# Patient Record
Sex: FEMALE | Race: WHITE | NOT HISPANIC OR LATINO | Employment: UNEMPLOYED | URBAN - METROPOLITAN AREA
[De-identification: names, ages, dates, MRNs, and addresses within clinical notes are randomized per-mention and may not be internally consistent; named-entity substitution may affect disease eponyms.]

---

## 2017-02-09 ENCOUNTER — TELEPHONE (OUTPATIENT)
Dept: CARDIOLOGY | Age: 42
End: 2017-02-09

## 2017-02-10 ENCOUNTER — TELEPHONE (OUTPATIENT)
Dept: TRANSPLANT | Age: 42
End: 2017-02-10

## 2017-02-17 ENCOUNTER — OFFICE VISIT (OUTPATIENT)
Dept: CARDIOLOGY | Age: 42
End: 2017-02-17
Attending: INTERNAL MEDICINE

## 2017-02-17 VITALS
HEART RATE: 70 BPM | WEIGHT: 177.5 LBS | HEIGHT: 64 IN | DIASTOLIC BLOOD PRESSURE: 62 MMHG | SYSTOLIC BLOOD PRESSURE: 110 MMHG | OXYGEN SATURATION: 95 % | BODY MASS INDEX: 30.3 KG/M2

## 2017-02-17 DIAGNOSIS — I42.2 HYPERTROPHIC CARDIOMYOPATHY (CMD): Primary | ICD-10-CM

## 2017-03-06 ENCOUNTER — TELEPHONE (OUTPATIENT)
Dept: TRANSPLANT | Age: 42
End: 2017-03-06

## 2019-05-29 ENCOUNTER — CARE COORDINATION (OUTPATIENT)
Dept: CARDIOLOGY | Facility: CLINIC | Age: 44
End: 2019-05-29

## 2019-05-29 NOTE — PROGRESS NOTES
Referral- Heart Failure      [facility] Notes:     5/29/19 - Received referral from Marnie PEREZ, RN, CCTC - VAD and Heart Transplant Coordinator at [facility] and the [facility]. At the request of insurance, they are looking for a 2nd opinion regarding pt. She has been on transplant list since 2016. Due to complexity of case they have not been able to proceed. Patient is encountering many difficulties.       Marnie can be reached at:  Phone: [phone]  Fax: [phone]    Sending TE to Leonarda along with the notes they sent - Additional notes in CE.     Christopher Suarez, [facility]   CORE and Heart Failure   Advanced Heart Failure Referrals

## 2019-06-03 ENCOUNTER — TELEPHONE (OUTPATIENT)
Dept: CARDIOLOGY | Facility: CLINIC | Age: 44
End: 2019-06-03

## 2019-06-03 ENCOUNTER — CARE COORDINATION (OUTPATIENT)
Dept: CARDIOLOGY | Facility: CLINIC | Age: 44
End: 2019-06-03

## 2019-06-03 NOTE — PROGRESS NOTES
Reviewed records in care everywhere. Patient has a h/o HOCM with myectomy years ago. She has been listed for Tx at [facility] and [facility] since 2016 and has a very high PRA and receiving plasmapheresis, is on IV milrinone. She is currently an inpatient. Notes in CE state her physicians have been in contact with [facility] to see if they are able to transplant her with high PRA.  I called Marnie in Transplant ([phone]) at [facility] and she confirmed they are waiting to see if [facility] will be able to take her. Gave her our contact number for her physicians to speak with Dr. Combs who is also currently on inpatient service to discuss case further should they elect not to send her to [facility].

## 2022-11-05 ENCOUNTER — WALK IN (OUTPATIENT)
Dept: URGENT CARE | Age: 47
End: 2022-11-05

## 2022-11-05 ENCOUNTER — IMAGING SERVICES (OUTPATIENT)
Dept: GENERAL RADIOLOGY | Age: 47
End: 2022-11-05
Attending: PHYSICIAN ASSISTANT

## 2022-11-05 ENCOUNTER — APPOINTMENT (OUTPATIENT)
Dept: LAB | Age: 47
End: 2022-11-05

## 2022-11-05 VITALS
HEART RATE: 71 BPM | OXYGEN SATURATION: 96 % | SYSTOLIC BLOOD PRESSURE: 122 MMHG | TEMPERATURE: 99.1 F | DIASTOLIC BLOOD PRESSURE: 77 MMHG | RESPIRATION RATE: 18 BRPM

## 2022-11-05 DIAGNOSIS — J21.0 RSV (ACUTE BRONCHIOLITIS DUE TO RESPIRATORY SYNCYTIAL VIRUS): ICD-10-CM

## 2022-11-05 DIAGNOSIS — J06.9 VIRAL URI WITH COUGH: Primary | ICD-10-CM

## 2022-11-05 DIAGNOSIS — R05.1 ACUTE COUGH: ICD-10-CM

## 2022-11-05 LAB
FLUAV RNA RESP QL NAA+PROBE: NOT DETECTED
FLUBV RNA RESP QL NAA+PROBE: NOT DETECTED
RSV AG NPH QL IA.RAPID: DETECTED
SARS-COV-2 RNA RESP QL NAA+PROBE: NOT DETECTED
SERVICE CMNT-IMP: ABNORMAL
SERVICE CMNT-IMP: ABNORMAL

## 2022-11-05 PROCEDURE — 99203 OFFICE O/P NEW LOW 30 MIN: CPT | Performed by: PHYSICIAN ASSISTANT

## 2022-11-05 PROCEDURE — 71046 X-RAY EXAM CHEST 2 VIEWS: CPT | Performed by: RADIOLOGY

## 2022-11-05 RX ORDER — ONDANSETRON 4 MG/1
4 TABLET, FILM COATED ORAL EVERY 8 HOURS PRN
COMMUNITY

## 2022-11-05 RX ORDER — MULTIVIT WITH MINERALS/LUTEIN
250 TABLET ORAL DAILY
COMMUNITY

## 2022-11-05 RX ORDER — MYCOPHENOLIC ACID 180 MG/1
TABLET, DELAYED RELEASE ORAL 2 TIMES DAILY
COMMUNITY

## 2022-11-05 RX ORDER — ROSUVASTATIN CALCIUM 20 MG/1
20 TABLET, COATED ORAL DAILY
COMMUNITY

## 2022-11-05 RX ORDER — METOPROLOL SUCCINATE 25 MG/1
TABLET, EXTENDED RELEASE ORAL DAILY
COMMUNITY

## 2022-11-05 RX ORDER — PRAMIPEXOLE DIHYDROCHLORIDE 0.5 MG/1
0.5 TABLET ORAL 3 TIMES DAILY
COMMUNITY

## 2022-11-05 RX ORDER — EVEROLIMUS 0.5 MG/1
TABLET ORAL
COMMUNITY

## 2022-11-05 RX ORDER — GABAPENTIN 300 MG/1
300 CAPSULE ORAL 3 TIMES DAILY
COMMUNITY

## 2022-11-05 RX ORDER — LANSOPRAZOLE 30 MG/1
30 TABLET, ORALLY DISINTEGRATING, DELAYED RELEASE ORAL DAILY
COMMUNITY

## 2022-11-05 RX ORDER — FERROUS SULFATE 325(65) MG
325 TABLET ORAL
COMMUNITY

## 2022-11-05 RX ORDER — CHOLECALCIFEROL (VITAMIN D3) 125 MCG
CAPSULE ORAL
COMMUNITY

## 2022-11-05 RX ORDER — PROCHLORPERAZINE MALEATE 5 MG/1
5 TABLET ORAL EVERY 6 HOURS PRN
COMMUNITY

## 2022-11-05 RX ORDER — PREDNISONE 5 MG/1
5 TABLET ORAL DAILY
COMMUNITY

## 2022-11-05 RX ORDER — TACROLIMUS 1 MG/1
1 CAPSULE ORAL 2 TIMES DAILY
COMMUNITY

## 2022-11-05 RX ORDER — MYCOPHENOLIC ACID 360 MG/1
360 TABLET, DELAYED RELEASE ORAL
COMMUNITY

## 2024-02-12 ENCOUNTER — WALK IN (OUTPATIENT)
Dept: URGENT CARE | Age: 49
End: 2024-02-12

## 2024-02-12 VITALS
HEART RATE: 85 BPM | WEIGHT: 202.6 LBS | SYSTOLIC BLOOD PRESSURE: 122 MMHG | DIASTOLIC BLOOD PRESSURE: 74 MMHG | RESPIRATION RATE: 16 BRPM | BODY MASS INDEX: 34.78 KG/M2 | OXYGEN SATURATION: 98 % | TEMPERATURE: 98.2 F

## 2024-02-12 DIAGNOSIS — B30.9 ACUTE VIRAL CONJUNCTIVITIS OF BOTH EYES: ICD-10-CM

## 2024-02-12 DIAGNOSIS — U07.1 COVID-19 VIRUS INFECTION: Primary | ICD-10-CM

## 2024-02-12 DIAGNOSIS — U07.1 COVID-19 VIRUS INFECTION: ICD-10-CM

## 2024-02-12 PROCEDURE — 99203 OFFICE O/P NEW LOW 30 MIN: CPT | Performed by: STUDENT IN AN ORGANIZED HEALTH CARE EDUCATION/TRAINING PROGRAM

## 2024-02-12 RX ORDER — ESTRADIOL 0.05 MG/D
1 PATCH, EXTENDED RELEASE TRANSDERMAL
COMMUNITY
Start: 2023-11-15

## 2024-02-12 RX ORDER — OFLOXACIN 3 MG/ML
1-2 SOLUTION/ DROPS OPHTHALMIC 4 TIMES DAILY
Qty: 10 ML | Refills: 0 | Status: SHIPPED | OUTPATIENT
Start: 2024-02-12 | End: 2024-02-19

## 2024-02-12 RX ORDER — EPLERENONE 50 MG/1
50 TABLET, FILM COATED ORAL DAILY
COMMUNITY
Start: 2023-11-15

## 2024-02-12 ASSESSMENT — ENCOUNTER SYMPTOMS
COUGH: 1
EYE DISCHARGE: 1
FEVER: 0
FATIGUE: 1
SHORTNESS OF BREATH: 0
DIARRHEA: 0
CHILLS: 0
EYE PAIN: 0
HEADACHES: 0
NAUSEA: 0
RHINORRHEA: 0
VOMITING: 0
SORE THROAT: 1
EYE REDNESS: 1

## 2024-02-12 ASSESSMENT — VISUAL ACUITY: OU: 1

## 2024-02-14 ENCOUNTER — TELEPHONE (OUTPATIENT)
Dept: URGENT CARE | Age: 49
End: 2024-02-14